# Patient Record
Sex: FEMALE | Race: BLACK OR AFRICAN AMERICAN | Employment: UNEMPLOYED | URBAN - METROPOLITAN AREA
[De-identification: names, ages, dates, MRNs, and addresses within clinical notes are randomized per-mention and may not be internally consistent; named-entity substitution may affect disease eponyms.]

---

## 2024-05-02 ENCOUNTER — HOSPITAL ENCOUNTER (EMERGENCY)
Facility: HOSPITAL | Age: 49
Discharge: HOME | End: 2024-05-03
Attending: EMERGENCY MEDICINE
Payer: MEDICAID

## 2024-05-02 DIAGNOSIS — M54.50 ACUTE RIGHT-SIDED LOW BACK PAIN WITHOUT SCIATICA: ICD-10-CM
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
## 2024-05-02 DIAGNOSIS — M25.551 ACUTE HIP PAIN, RIGHT: ICD-10-CM

## 2024-05-02 PROCEDURE — 96372 THER/PROPH/DIAG INJ SC/IM: CPT

## 2024-05-02 PROCEDURE — 99284 EMERGENCY DEPT VISIT MOD MDM: CPT

## 2024-05-02 PROCEDURE — 6360000002 HC RX W HCPCS: Performed by: EMERGENCY MEDICINE

## 2024-05-02 PROCEDURE — 6370000000 HC RX 637 (ALT 250 FOR IP): Performed by: EMERGENCY MEDICINE

## 2024-05-02 RX ORDER — DIAZEPAM 5 MG/1
5 TABLET ORAL ONCE
Status: COMPLETED | OUTPATIENT
Start: 2024-05-02 | End: 2024-05-02

## 2024-05-02 RX ORDER — KETOROLAC TROMETHAMINE 30 MG/ML
30 INJECTION, SOLUTION INTRAMUSCULAR; INTRAVENOUS
Status: COMPLETED | OUTPATIENT
Start: 2024-05-02 | End: 2024-05-02

## 2024-05-02 RX ADMIN — KETOROLAC TROMETHAMINE 30 MG: 30 INJECTION, SOLUTION INTRAMUSCULAR at 23:33

## 2024-05-02 RX ADMIN — DIAZEPAM 5 MG: 5 TABLET ORAL at 23:32

## 2024-05-03 ENCOUNTER — APPOINTMENT (OUTPATIENT)
Facility: HOSPITAL | Age: 49
End: 2024-05-03
Payer: MEDICAID

## 2024-05-03 VITALS
RESPIRATION RATE: 19 BRPM | TEMPERATURE: 99 F | SYSTOLIC BLOOD PRESSURE: 130 MMHG | HEART RATE: 98 BPM | DIASTOLIC BLOOD PRESSURE: 80 MMHG | HEIGHT: 65 IN | OXYGEN SATURATION: 97 % | BODY MASS INDEX: 35.65 KG/M2 | WEIGHT: 214 LBS

## 2024-05-03 PROCEDURE — 73502 X-RAY EXAM HIP UNI 2-3 VIEWS: CPT

## 2024-05-03 PROCEDURE — 72100 X-RAY EXAM L-S SPINE 2/3 VWS: CPT

## 2024-05-03 RX ORDER — CYCLOBENZAPRINE HCL 10 MG
10 TABLET ORAL 3 TIMES DAILY PRN
Qty: 21 TABLET | Refills: 0 | Status: SHIPPED | OUTPATIENT
Start: 2024-05-03 | End: 2024-05-13

## 2024-05-03 RX ORDER — NAPROXEN 500 MG/1
500 TABLET ORAL 2 TIMES DAILY
Qty: 60 TABLET | Refills: 0 | Status: SHIPPED | OUTPATIENT
Start: 2024-05-03

## 2024-05-03 ASSESSMENT — ENCOUNTER SYMPTOMS
BACK PAIN: 1
ABDOMINAL PAIN: 0
DIARRHEA: 0
NAUSEA: 0
SHORTNESS OF BREATH: 0
VOMITING: 0

## 2024-05-03 NOTE — ED PROVIDER NOTES
PROCEDURES     none    MDM     Patient is a 48 y.o. female who presents with right hip and lower back pain after mechanical fall earlier today.  Patient has reassuring imaging without evidence of fracture.  Has been able to get up and ambulate in the emergency department after medication provided.  Will discharge home with continued supportive care.  Patient acknowledges understanding and is in agreement with plan for discharge at this time.   CRITICAL CARE TIME      None     SCREENINGS AND COUNSELING     TOBACCO COUNSELING:  During evaluation pt reported that they are a current tobacco user.    I have spent 3 minutes discussing the medical risks of prolonged smoking habits and advised the patient of the benefits of the cessation of smoking, providing specific suggestions on how to quit.     Pt has been counseled and encouraged to quit as soon as possible in order to decrease further risks to their health. Pt has conveyed their understanding of the risks involved should they continue to use tobacco products.    SOCIAL DETERMINATES OF HEALTH AFFECTING DX OR TX     Substance Abuse  Tobacco Dependence  Education level    FINAL IMPRESSION     1. Fall, initial encounter    2. Acute hip pain, right    3. Acute right-sided low back pain without sciatica         DISPOSITION/PLAN     Discharge to home     Zhanna Bergman's  results have been reviewed with her.  She has been counseled regarding her diagnosis, treatment, and plan.  She verbally conveys understanding and agreement of the signs, symptoms, diagnosis, treatment and prognosis and additionally agrees to follow up as discussed.  She also agrees with the care-plan.  I believe that all of her questions have been answered.  I have also provided discharge instructions for her that include: educational information regarding their diagnosis and treatment, and list of reasons why they would want to return to the ED prior to their follow-up appointment, should her

## 2024-05-03 NOTE — DISCHARGE INSTRUCTIONS
List of Pain Management Specialists:    Piotr Cage M.D. [phone] Pain Management  Celso Cardona M.D. [phone] Physical Medicine and Rehabilitation  Fei Cruz M.D. [phone] Physical Medicine and Rehabilitation  Efra Elizabeth M.D. [phone] Pain Management  Jeff Feng M.D. [phone] Pain Management    Dr. Efra Blake  [facility]                   [facility], [facility]  [address]                                         [address], [address]  [place] [address]                                     [place], [place] [id number] [phone]    [facility]                            ISABEL Sen MD DO  [address] [address]                               [address], [address]  [place] [address]                            [place], [place] [id number] [phone]    Dr. Celso Resendez  [address]                                   [address] [place] [address]                                     [place] [address] [phone]                                                                            Dr. Korey Beach  [facility], [facility] [address]          [facility]  [address]                                           [address], [address]  [place] [address]                                        [place] [address]  [email or web address]                                            [phone]

## 2024-05-03 NOTE — ED NOTES
Discussed and reviewed discharge instructions with patient. Provided opportunity for questions, patient expressed understanding. Reviewed reasons to return to the Emergency Department. Patient stable at time of discharge.  Directed t ER waiting area waiting for Uber ride.

## 2025-07-21 ENCOUNTER — HOSPITAL ENCOUNTER (EMERGENCY)
Facility: HOSPITAL | Age: 50
Discharge: HOME | End: 2025-07-21
Payer: MEDICAID

## 2025-07-21 VITALS
TEMPERATURE: 97.7 F | HEIGHT: 66 IN | WEIGHT: 214 LBS | RESPIRATION RATE: 16 BRPM | OXYGEN SATURATION: 99 % | DIASTOLIC BLOOD PRESSURE: 101 MMHG | HEART RATE: 65 BPM | SYSTOLIC BLOOD PRESSURE: 146 MMHG | BODY MASS INDEX: 34.39 KG/M2

## 2025-07-21 DIAGNOSIS — Z76.0 ENCOUNTER FOR MEDICATION REFILL: ICD-10-CM

## 2025-07-21 DIAGNOSIS — E11.65 HYPERGLYCEMIA DUE TO DIABETES MELLITUS (HCC): Primary | ICD-10-CM

## 2025-07-21 LAB
GLUCOSE BLD STRIP.AUTO-MCNC: 296 MG/DL (ref 65–117)
SERVICE CMNT-IMP: ABNORMAL

## 2025-07-21 PROCEDURE — 6370000000 HC RX 637 (ALT 250 FOR IP): Performed by: PHYSICIAN ASSISTANT

## 2025-07-21 PROCEDURE — 99283 EMERGENCY DEPT VISIT LOW MDM: CPT

## 2025-07-21 PROCEDURE — 82962 GLUCOSE BLOOD TEST: CPT

## 2025-07-21 RX ORDER — METFORMIN HYDROCHLORIDE 500 MG/1
500 TABLET, EXTENDED RELEASE ORAL
Qty: 30 TABLET | Refills: 0 | Status: SHIPPED | OUTPATIENT
Start: 2025-07-21

## 2025-07-21 RX ORDER — GLUCOSAMINE HCL/CHONDROITIN SU 500-400 MG
CAPSULE ORAL
Qty: 100 STRIP | Refills: 0 | Status: SHIPPED | OUTPATIENT
Start: 2025-07-21

## 2025-07-21 RX ADMIN — METFORMIN HYDROCHLORIDE 500 MG: 500 TABLET ORAL at 20:17

## 2025-07-21 ASSESSMENT — PAIN - FUNCTIONAL ASSESSMENT: PAIN_FUNCTIONAL_ASSESSMENT: NONE - DENIES PAIN

## 2025-07-22 NOTE — ED PROVIDER NOTES
[facility] EMERGENCY DEPARTMENT  EMERGENCY DEPARTMENT ENCOUNTER       Pt Name: Zhanna Bergman  MRN: [id number]  Birthdate 1975  Date of evaluation: 7/21/2025  Provider: CARLOS A Heck   PCP: Eva Cantrell MD  Note Started: 8:18 PM EDT 7/21/25     CHIEF COMPLAINT       Chief Complaint   Patient presents with    Hyperglycemia     Patient BIBEMS c/o hyperglycemia d/t running out of her metformin a couple days ago. Advised to come to the ER for a refill.  in triage.       HISTORY OF PRESENT ILLNESS: 1 or more elements      History From: Patient  HPI Limitations: None     Zhanna Bergman is a 50 y.o. female who presents by EMS for medication refill. She reports a history of type II DM. She has been on Metformin  mg daily but missed an appointment with her PCP and has run out of the medication. She last took it 2 days ago. BS prior to arrival was 310.      Nursing Notes were all reviewed and agreed with or any disagreements were addressed in the HPI.     REVIEW OF SYSTEMS      Review of Systems     Positives and Pertinent negatives as per HPI.    PAST HISTORY     Past Medical History:  No past medical history on file.    Past Surgical History:  No past surgical history on file.    Family History:  No family history on file.    Social History:       Allergies:  No Known Allergies    CURRENT MEDICATIONS      Previous Medications    NAPROXEN (NAPROSYN) 500 MG TABLET    Take 1 tablet by mouth 2 times daily       SCREENINGS               No data recorded        PHYSICAL EXAM      ED Triage Vitals   Encounter Vitals Group      BP 07/21/25 1857 (!) 146/101      Systolic BP Percentile --       Diastolic BP Percentile --       Pulse 07/21/25 1857 65      Respirations 07/21/25 1857 16      Temp 07/21/25 1857 97.7 °F (36.5 °C)      Temp src --       SpO2 07/21/25 1857 99 %      Weight - Scale 07/21/25 1859 97.1 kg (214 lb)      Height 07/21/25 1859 1.676 m (5' 6\")      Head Circumference --

## 2025-07-22 NOTE — DISCHARGE INSTRUCTIONS
It was a pleasure taking care of you at [facility] today.      We know that when you come to [facility], you are entrusting us with your health, comfort, and safety.  Our physicians and nurses honor that trust, and we appreciate the opportunity to care for you and your loved ones.  We also value your feedback, and we would like to hear from you.    When you receive a  >>> survey <<< about your Emergency Department experience today, please fill it out. We review every single response from our patients. Thank you!